# Patient Record
Sex: MALE | Race: WHITE | NOT HISPANIC OR LATINO
[De-identification: names, ages, dates, MRNs, and addresses within clinical notes are randomized per-mention and may not be internally consistent; named-entity substitution may affect disease eponyms.]

---

## 2022-06-05 ENCOUNTER — NON-APPOINTMENT (OUTPATIENT)
Age: 77
End: 2022-06-05

## 2022-06-06 ENCOUNTER — APPOINTMENT (OUTPATIENT)
Dept: OTOLARYNGOLOGY | Facility: CLINIC | Age: 77
End: 2022-06-06
Payer: MEDICARE

## 2022-06-06 ENCOUNTER — RX RENEWAL (OUTPATIENT)
Age: 77
End: 2022-06-06

## 2022-06-06 VITALS — TEMPERATURE: 97.6 F | HEIGHT: 69 IN | WEIGHT: 140 LBS | BODY MASS INDEX: 20.73 KG/M2

## 2022-06-06 DIAGNOSIS — K21.9 GASTRO-ESOPHAGEAL REFLUX DISEASE W/OUT ESOPHAGITIS: ICD-10-CM

## 2022-06-06 DIAGNOSIS — R49.0 DYSPHONIA: ICD-10-CM

## 2022-06-06 DIAGNOSIS — H93.19 TINNITUS, UNSPECIFIED EAR: ICD-10-CM

## 2022-06-06 DIAGNOSIS — J34.2 DEVIATED NASAL SEPTUM: ICD-10-CM

## 2022-06-06 DIAGNOSIS — H61.23 IMPACTED CERUMEN, BILATERAL: ICD-10-CM

## 2022-06-06 DIAGNOSIS — R13.10 DYSPHAGIA, UNSPECIFIED: ICD-10-CM

## 2022-06-06 DIAGNOSIS — J31.0 CHRONIC RHINITIS: ICD-10-CM

## 2022-06-06 PROBLEM — Z00.00 ENCOUNTER FOR PREVENTIVE HEALTH EXAMINATION: Status: ACTIVE | Noted: 2022-06-06

## 2022-06-06 PROCEDURE — 31231 NASAL ENDOSCOPY DX: CPT

## 2022-06-06 PROCEDURE — 69210 REMOVE IMPACTED EAR WAX UNI: CPT

## 2022-06-06 PROCEDURE — 99204 OFFICE O/P NEW MOD 45 MIN: CPT | Mod: 25

## 2022-06-06 RX ORDER — FAMOTIDINE 40 MG/1
40 TABLET, FILM COATED ORAL
Qty: 30 | Refills: 2 | Status: ACTIVE | COMMUNITY
Start: 2022-06-06 | End: 1900-01-01

## 2022-06-06 RX ORDER — FAMOTIDINE 40 MG/1
40 TABLET, FILM COATED ORAL
Qty: 90 | Refills: 0 | Status: ACTIVE | COMMUNITY
Start: 2022-06-06 | End: 1900-01-01

## 2022-06-06 NOTE — PHYSICAL EXAM
[FreeTextEntry1] : General:\par The patient was alert and oriented and in no distress.\par Voice was clear.  He has had multiple skin excisions and reconstructions of the face.\par \par Ears:\par  Procedure note:\par  Removal of Cerumen Impactions, both ears:  60254-04\par Both external ears were normal.\par There were symptomatic cerumen impactions in both ears.  These were cleared microscopically without trauma using both suction and curettes.  After clearing,  both ear canals were clear both eardrums were intact and mobile.  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 All risks and benefits were discussed with the patient and consent obtained.\par \par Nasal endoscopy was done with topical anesthesia of Pontocaine and Afrin and a      nasal endoscope.\par Indication: Nasal congestion, rule out sinusitis.\par Procedure: The nasal cavity was anesthetized with topical Afrin and Pontocaine. An  endoscope was used and inserted into the nasal cavity.\par Attention was first paid to the anterior nasal cavity.\par Endocoscopy was performed to inspect the interior of the nasal cavity, the nasal septum,  the middle and superior meati, the inferior, middle and superior turbinates, and the spheno-ethmoidal  recesses, the nasopharynx and eustachian tube orifices bilaterally. \par All findings were normal except:\par He has a sharp right septal deflection coming back to the left but no polyps purulence or masses.  The mucosa is quite dry.\par \par Flexible fiberoptic laryngoscopy: CPT 58696\par Indications: Dysphagia\par Procedure note:\par  \par Flexible fiberoptic laryngoscopy was performed because of dysphagia and the patient's inability to tolerate adequate mirror examination.\par The nasal cavity was anesthetized with Pontocaine plus Afrin.\par \par \par The nasal cavity was anesthetized with Pontocaine and Afrin.\par The flexible endoscope was placed into the patient's nasal cavity.\par The nasopharynx was without masses.\par The oropharynx, vallecula and base of tongue had no masses.\par The epiglottis, aryepiglottic folds and false vocal cords were normal.\par The pyriform sinuses were without mucosal lesions or pooling of secretions.  \par The laryngeal ventricles were without lesions.\par The visualized subglottis was without mass.\par The lateral and posterior pharyngeal walls were clear and symmetrical.\par The vocal folds were clear and mobile; they abducted and adducted normally.\par There was no interarytenoid mass or fullness.\par There was significant posterior laryngeal inflammation consistent with reflux.  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 All risks and benefits were discussed with the patient and consent obtained.

## 2022-06-06 NOTE — HISTORY OF PRESENT ILLNESS
[de-identified] : ELMA SMALL JR is a 77 year old male who comes in complaining of several issues as far as the head and neck.  He was referred by Dr. Hopkins.  He was placed on antibiotics after his oral surgery and developed thrush.  This did not respond to nystatin but he was switched to Diflucan which she finished last week.  Since then he has noticed that his voice feels hoarse.  It seems to wax and wane.  He has a family history of throat cancer and is obviously concerned.  He has a long history of dysphagia to liquids.  It causes him to cough.  In the past, he has been told that he has diminished sensation in the throat.  He also has had a long history of tinnitus but does not feel there has been any problems with his hearing.  He was also told he has pockets in the external canal the cause him to get cerumen impaction.  The patient had no other ear nose or throat complaints at this visit.

## 2022-06-06 NOTE — REASON FOR VISIT
[Initial Consultation] : an initial consultation for [Gastroesophageal Reflux] : gastroesophageal reflux [FreeTextEntry2] : throat checkup; reflux

## 2022-06-06 NOTE — REVIEW OF SYSTEMS
[Hoarseness] : hoarseness [As Noted in HPI] : as noted in HPI [Recent Weight Loss (___ Lbs)] : recent [unfilled] ~Ulb weight loss [Negative] : Heme/Lymph [de-identified] : tinnitus [de-identified] : swallowing [FreeTextEntry4] : s

## 2022-06-06 NOTE — ASSESSMENT
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  There is no evidence of neoplasia.  He has been taking pantoprazole at bedtime.  I suggested to switch the pantoprazole to before breakfast and recommended famotidine at bedtime and reviewed an antireflux diet with him and would like to reevaluate in 6 weeks to make sure this is improving.\par \par He has a significant septal deflection and nasal valve collapse but is not bothered by this other than mouth breathing at night and I did not suggest intervention other than hydration.\par \par He has pooling of secretions and sounds like he aspirates clear liquids.  I suggested using thicker liquids to drink but also recommended a swallow evaluation.  He wanted to hold off.\par \par

## 2022-07-13 ENCOUNTER — APPOINTMENT (OUTPATIENT)
Dept: OTOLARYNGOLOGY | Facility: CLINIC | Age: 77
End: 2022-07-13